# Patient Record
Sex: FEMALE | Race: WHITE | NOT HISPANIC OR LATINO | Employment: FULL TIME | URBAN - METROPOLITAN AREA
[De-identification: names, ages, dates, MRNs, and addresses within clinical notes are randomized per-mention and may not be internally consistent; named-entity substitution may affect disease eponyms.]

---

## 2024-07-12 ENCOUNTER — OFFICE VISIT (OUTPATIENT)
Dept: NEUROSURGERY | Facility: CLINIC | Age: 39
End: 2024-07-12
Payer: COMMERCIAL

## 2024-07-12 VITALS — TEMPERATURE: 97.5 F | WEIGHT: 179.3 LBS | HEIGHT: 67 IN | BODY MASS INDEX: 28.14 KG/M2

## 2024-07-12 DIAGNOSIS — G93.5 CHIARI I MALFORMATION: Primary | ICD-10-CM

## 2024-07-12 PROCEDURE — 1159F MED LIST DOCD IN RCRD: CPT | Performed by: NEUROLOGICAL SURGERY

## 2024-07-12 PROCEDURE — 1160F RVW MEDS BY RX/DR IN RCRD: CPT | Performed by: NEUROLOGICAL SURGERY

## 2024-07-12 PROCEDURE — 99203 OFFICE O/P NEW LOW 30 MIN: CPT | Performed by: NEUROLOGICAL SURGERY

## 2024-07-12 RX ORDER — METHOCARBAMOL 750 MG/1
TABLET, FILM COATED ORAL
COMMUNITY
Start: 2024-05-30

## 2024-07-12 RX ORDER — RIZATRIPTAN BENZOATE 10 MG/1
TABLET ORAL
COMMUNITY
Start: 2024-04-30

## 2024-07-12 RX ORDER — ACETAMINOPHEN AND CODEINE PHOSPHATE 300; 30 MG/1; MG/1
TABLET ORAL
COMMUNITY

## 2024-07-12 RX ORDER — IBUPROFEN 800 MG/1
TABLET ORAL
COMMUNITY
Start: 2024-05-30

## 2024-07-12 RX ORDER — AMITRIPTYLINE HYDROCHLORIDE 10 MG/1
TABLET, FILM COATED ORAL
COMMUNITY
Start: 2024-05-30

## 2024-07-12 NOTE — PROGRESS NOTES
Patient: Freddie Salgado  : 1985    Primary Care Provider: Sharmila Herrera APRN    Requesting Provider: As above        History    Chief Complaint: Headache.    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  This can be atop her head or occipital.  Her headache tends to be dull.  Sometimes she has nausea or associated dizziness.  The headaches do not occur every day.  She is not sure what makes the headaches better or worse.  She has no extremity, thoracic, abdominal symptoms.    Review of Systems   Constitutional:  Negative for activity change, appetite change, chills, diaphoresis, fatigue, fever and unexpected weight change.   HENT:  Negative for congestion, dental problem, drooling, ear discharge, ear pain, facial swelling, hearing loss, mouth sores, nosebleeds, postnasal drip, rhinorrhea, sinus pressure, sinus pain, sneezing, sore throat, tinnitus, trouble swallowing and voice change.    Eyes:  Negative for photophobia, pain, discharge, redness, itching and visual disturbance.   Respiratory:  Negative for apnea, cough, choking, chest tightness, shortness of breath, wheezing and stridor.    Cardiovascular:  Negative for chest pain, palpitations and leg swelling.   Gastrointestinal:  Negative for abdominal distention, abdominal pain, anal bleeding, blood in stool, constipation, diarrhea, nausea, rectal pain and vomiting.   Endocrine: Negative for cold intolerance, heat intolerance, polydipsia, polyphagia and polyuria.   Genitourinary:  Negative for decreased urine volume, difficulty urinating, dyspareunia, dysuria, enuresis, flank pain, frequency, genital sores, hematuria, menstrual problem, pelvic pain, urgency, vaginal bleeding, vaginal discharge and vaginal pain.   Musculoskeletal:  Negative for arthralgias, back pain, gait problem, joint swelling, myalgias, neck pain and neck stiffness.   Skin:  Negative  "for color change, pallor, rash and wound.   Allergic/Immunologic: Negative for environmental allergies, food allergies and immunocompromised state.   Neurological:  Positive for speech difficulty and headaches. Negative for dizziness, tremors, seizures, syncope, facial asymmetry, weakness, light-headedness and numbness.   Hematological:  Negative for adenopathy. Does not bruise/bleed easily.   Psychiatric/Behavioral:  Negative for agitation, behavioral problems, confusion, decreased concentration, dysphoric mood, hallucinations, self-injury, sleep disturbance and suicidal ideas. The patient is not nervous/anxious and is not hyperactive.      The patient's past medical history, past surgical history, family history, and social history have been reviewed at length in the electronic medical record.      Physical Exam:   Temp 97.5 °F (36.4 °C) (Infrared)   Ht 170.2 cm (67\")   Wt 81.3 kg (179 lb 4.8 oz)   BMI 28.08 kg/m²   CONSTITUTIONAL: Patient is well-nourished, pleasant and appears stated age.  MUSCULOSKELETAL:  Neck tenderness to palpation is not observed.   ROM in the neck is normal.  NEUROLOGICAL:  Orientation, memory, attention span, language function, and cognition have been examined and are intact.  Strength is intact in the upper and lower extremities to direct testing.  Muscle tone is normal throughout.  Station and gait are normal.  Sensation is intact to light touch testing throughout.  Deep tendon reflexes are 1+ and symmetrical.  Jenniffer's Sign is negative bilaterally. No clonus is elicited.  Coordination is intact.  CRANIAL NERVES:  Cranial Nerve II: Visual fields are full to confrontation.  Cranial Nerve III, IV, and VI: PERRLADC. Extraocular movements are intact.  Nystagmus is not present.  Cranial Nerve V: Facial sensation is intact to light touch.  Cranial Nerve VII: Muscles of facial expression demonstate no weakness or asymmetry.  Cranial Nerve VIII: Hearing is intact to finger rub " bilaterally.  Cranial Nerve IX and X: Palate elevates symmetrically.  Cranial Nerve XI: Shoulder shrug is intact bilaterally.  Cranial Nerve XII: Tongue is midline without evidence of atrophy or fasciculation.    Medical Decision Making    Data Review:   (All imaging studies were personally reviewed unless stated otherwise)  MRI of the brain dated 5/20/2024 without contrast demonstrates a Chiari I malformation.  Tonsils to send several millimeters below the foramen magnum.  I can visualize the cervical spinal cord to about the C4 level.  There is no syrinx.    Diagnosis:   1.  Nonspecific headache syndrome.  2.  Incidental Chiari malformation.    Treatment Options:   Based on her history I do not think the Chiari malformation is symptomatic and does not require treatment.  She will follow-up with neurosurgery on an as-needed basis.      Scribed for Jorje Grimm MD by Brittny Slaughter MA on 7/12/2024 11:05 EDT      I, Dr. Grimm, personally performed the services described in the documentation, as scribed in my presence, and it is both accurate and complete.

## 2025-03-12 PROBLEM — I25.85 CARDIAC MICROVASCULAR DISEASE: Status: ACTIVE | Noted: 2025-03-12

## 2025-03-12 NOTE — PROGRESS NOTES
"    Cardiology Follow-Up Note     Name: Freddie Salgado  :   1985  PCP: Sharmila Herrera, APRN  Date:   2025  Department: [facility] [place] [facility] CARDIOLOGY  [address]  [facility] [address]  Fax [phone]  Phone [phone]    Chief Complaint   Patient presents with    Hyperlipidemia    Hypertension       Subjective     History of Present Illness  Freddie Salgado is a 39 y.o. female who presents today for routine 3-month follow-up, patient was last seen in 2024.  Patient has a past medical history including.  The patient states that she is doing well, she ran out of her Ranexa medication and started having chest pain, she is here for refill.  She had also somewhat elevated cholesterol and has done diet modification.      Left heart cath 2023 EF 65%, normal coronaries normal LV function and microvascular disease.  2D echo 2023 EF 55 to 60%    Past Medical History:   Diagnosis Date    Abnormal Pap smear of cervix     Apnea     Chest pain     Gestational hypertension     1 episode, labs normal and resolved in this pregnancy    HPV (human papilloma virus) infection     Hyperemesis gravidarum       Past Surgical History:   Procedure Laterality Date    BARIATRIC SURGERY      CARDIAC CATHETERIZATION      23 [facility] IOP: Angina, and abnormal stress study / EF 65% /  Normal coronaries with normal LV function and Microvascular DZ.     SECTION      CHOLECYSTECTOMY      OTHER SURGICAL HISTORY      23 Left and right greater saphenous vein RF ablation was completed  without difficulty.     No current outpatient medications on file.    Objective     Vital Signs:  /82 (BP Location: Left arm, Patient Position: Sitting, Cuff Size: Adult)   Pulse 75   Ht 165.1 cm (65\")   Wt 81.6 kg (180 lb)   BMI 29.95 kg/m²   Estimated body mass index is 29.95 kg/m² as calculated from the following:    Height as of this encounter: 165.1 cm " "(65\").    Weight as of this encounter: 81.6 kg (180 lb).             Vitals reviewed.   Constitutional:       Appearance: Normal and healthy appearance.   Eyes:      Pupils: Pupils are equal, round, and reactive to light.   Pulmonary:      Effort: Pulmonary effort is normal.   Chest:      Chest wall: Not tender to palpatation.   Cardiovascular:      PMI at left midclavicular line. Normal rate. Regular rhythm. Normal S1. Normal S2.       Murmurs: There is no murmur.      No gallop.  No click. No rub.   Pulses:     Intact distal pulses.   Edema:     Peripheral edema absent.   Skin:     General: Skin is warm.   Psychiatric:         Behavior: Behavior is cooperative.              Data Review:   Lab Results   Component Value Date    CREATININE 0.50 (L) 12/17/2016    AST 13 12/17/2016    ALT 13 12/17/2016     No results found for: \"CHOL\", \"CHLPL\", \"TRIG\", \"HDL\", \"LDL\", \"LDLDIRECT\"   Lab Results   Component Value Date    WBC 8.25 12/17/2016    RBC 4.50 12/17/2016    HGB 11.5 12/17/2016    HCT 34.6 12/17/2016    MCV 76.9 (L) 12/17/2016     12/17/2016     No results found for: \"TSH\"  No results found for: \"HGBA1C\"  No results found for: \"INR\", \"PROTIME\"        Assessment and Plan     Assessment & Plan  Cardiac microvascular disease  She will continue Ranexa 500 mg p.o. twice daily.  Notify if symptoms gets worse.  I have reviewed patient rest of the medications and she is not taking Procardia she has never taken that medicine.  Therefore we will take that off the list.  Orders:    ranolazine (Ranexa) 500 MG 12 hr tablet; Take 1 tablet by mouth 2 (Two) Times a Day.    Lipid Panel; Future    Basic Metabolic Panel; Future    CBC & Differential; Future    Chronic venous insufficiency  The patient will continue to wear compression socks 20 to 30 mmHg pressure exercise, weight reduction, keep legs elevated on a pillow when resting above the level of the waistline.  Orders:    ranolazine (Ranexa) 500 MG 12 hr tablet; Take 1 " tablet by mouth 2 (Two) Times a Day.    Lipid Panel; Future    Basic Metabolic Panel; Future    CBC & Differential; Future    Hyperlipidemia LDL goal <70  Will check FLP LFTs for evaluation of the LDL level.  Patient will have blood drawn here today.           Advised to continue current cardiac medications. Please notify of any issues. Discussed with the patient compliance with medical management and follow-up.     Follow Up  Return in about 3 months (around 6/13/2025).    Call if you have any significant symptoms or go to the [facility] if possible.     Giles Louis MD, FACC,[facility]  [place] [facility]    Part of this note may be an electronic transcription/translation of spoken language to printed text using the Dragon Dictation System.

## 2025-03-13 ENCOUNTER — LAB (OUTPATIENT)
Facility: HOSPITAL | Age: 40
End: 2025-03-13
Payer: COMMERCIAL

## 2025-03-13 ENCOUNTER — OFFICE VISIT (OUTPATIENT)
Age: 40
End: 2025-03-13
Payer: COMMERCIAL

## 2025-03-13 VITALS
HEART RATE: 75 BPM | BODY MASS INDEX: 29.99 KG/M2 | DIASTOLIC BLOOD PRESSURE: 82 MMHG | SYSTOLIC BLOOD PRESSURE: 108 MMHG | HEIGHT: 65 IN | WEIGHT: 180 LBS

## 2025-03-13 DIAGNOSIS — I25.85 CARDIAC MICROVASCULAR DISEASE: ICD-10-CM

## 2025-03-13 DIAGNOSIS — I25.85 CARDIAC MICROVASCULAR DISEASE: Primary | ICD-10-CM

## 2025-03-13 DIAGNOSIS — I87.2 CHRONIC VENOUS INSUFFICIENCY: ICD-10-CM

## 2025-03-13 DIAGNOSIS — E78.5 HYPERLIPIDEMIA LDL GOAL <70: ICD-10-CM

## 2025-03-13 LAB
ANION GAP SERPL CALCULATED.3IONS-SCNC: 11.2 MMOL/L (ref 5–15)
BASOPHILS # BLD AUTO: 0.06 10*3/MM3 (ref 0–0.2)
BASOPHILS NFR BLD AUTO: 1 % (ref 0–1.5)
BUN SERPL-MCNC: 10 MG/DL (ref 6–20)
BUN/CREAT SERPL: 11.9 (ref 7–25)
CALCIUM SPEC-SCNC: 9.2 MG/DL (ref 8.6–10.5)
CHLORIDE SERPL-SCNC: 105 MMOL/L (ref 98–107)
CHOLEST SERPL-MCNC: 247 MG/DL (ref 0–200)
CO2 SERPL-SCNC: 24.8 MMOL/L (ref 22–29)
CREAT SERPL-MCNC: 0.84 MG/DL (ref 0.57–1)
DEPRECATED RDW RBC AUTO: 44.3 FL (ref 37–54)
EGFRCR SERPLBLD CKD-EPI 2021: 89.7 ML/MIN/1.73
EOSINOPHIL # BLD AUTO: 0.15 10*3/MM3 (ref 0–0.4)
EOSINOPHIL NFR BLD AUTO: 2.5 % (ref 0.3–6.2)
ERYTHROCYTE [DISTWIDTH] IN BLOOD BY AUTOMATED COUNT: 14.5 % (ref 12.3–15.4)
GLUCOSE SERPL-MCNC: 81 MG/DL (ref 65–99)
HCT VFR BLD AUTO: 43.4 % (ref 34–46.6)
HDLC SERPL-MCNC: 58 MG/DL (ref 40–60)
HGB BLD-MCNC: 13.7 G/DL (ref 12–15.9)
IMM GRANULOCYTES # BLD AUTO: 0.02 10*3/MM3 (ref 0–0.05)
IMM GRANULOCYTES NFR BLD AUTO: 0.3 % (ref 0–0.5)
LDLC SERPL CALC-MCNC: 173 MG/DL (ref 0–100)
LDLC/HDLC SERPL: 2.94 {RATIO}
LYMPHOCYTES # BLD AUTO: 1.45 10*3/MM3 (ref 0.7–3.1)
LYMPHOCYTES NFR BLD AUTO: 24 % (ref 19.6–45.3)
MCH RBC QN AUTO: 26.3 PG (ref 26.6–33)
MCHC RBC AUTO-ENTMCNC: 31.6 G/DL (ref 31.5–35.7)
MCV RBC AUTO: 83.3 FL (ref 79–97)
MONOCYTES # BLD AUTO: 0.8 10*3/MM3 (ref 0.1–0.9)
MONOCYTES NFR BLD AUTO: 13.2 % (ref 5–12)
NEUTROPHILS NFR BLD AUTO: 3.56 10*3/MM3 (ref 1.7–7)
NEUTROPHILS NFR BLD AUTO: 59 % (ref 42.7–76)
NRBC BLD AUTO-RTO: 0 /100 WBC (ref 0–0.2)
PLATELET # BLD AUTO: 321 10*3/MM3 (ref 140–450)
PMV BLD AUTO: 9.4 FL (ref 6–12)
POTASSIUM SERPL-SCNC: 4.2 MMOL/L (ref 3.5–5.2)
RBC # BLD AUTO: 5.21 10*6/MM3 (ref 3.77–5.28)
SODIUM SERPL-SCNC: 141 MMOL/L (ref 136–145)
TRIGL SERPL-MCNC: 92 MG/DL (ref 0–150)
VLDLC SERPL-MCNC: 16 MG/DL (ref 5–40)
WBC NRBC COR # BLD AUTO: 6.04 10*3/MM3 (ref 3.4–10.8)

## 2025-03-13 PROCEDURE — 80048 BASIC METABOLIC PNL TOTAL CA: CPT

## 2025-03-13 PROCEDURE — 36415 COLL VENOUS BLD VENIPUNCTURE: CPT

## 2025-03-13 PROCEDURE — 85025 COMPLETE CBC W/AUTO DIFF WBC: CPT

## 2025-03-13 PROCEDURE — 80061 LIPID PANEL: CPT

## 2025-03-13 RX ORDER — RANOLAZINE 500 MG/1
500 TABLET, EXTENDED RELEASE ORAL 2 TIMES DAILY
Qty: 180 TABLET | Refills: 1 | Status: SHIPPED | OUTPATIENT
Start: 2025-03-13

## 2025-03-13 NOTE — ASSESSMENT & PLAN NOTE
Will check FLP LFTs for evaluation of the LDL level.  Patient will have blood drawn here today.

## 2025-03-13 NOTE — ASSESSMENT & PLAN NOTE
She will continue Ranexa 500 mg p.o. twice daily.  Notify if symptoms gets worse.  I have reviewed patient rest of the medications and she is not taking Procardia she has never taken that medicine.  Therefore we will take that off the list.  Orders:    ranolazine (Ranexa) 500 MG 12 hr tablet; Take 1 tablet by mouth 2 (Two) Times a Day.    Lipid Panel; Future    Basic Metabolic Panel; Future    CBC & Differential; Future

## 2025-03-13 NOTE — ASSESSMENT & PLAN NOTE
The patient will continue to wear compression socks 20 to 30 mmHg pressure exercise, weight reduction, keep legs elevated on a pillow when resting above the level of the waistline.  Orders:    ranolazine (Ranexa) 500 MG 12 hr tablet; Take 1 tablet by mouth 2 (Two) Times a Day.    Lipid Panel; Future    Basic Metabolic Panel; Future    CBC & Differential; Future

## 2025-06-25 NOTE — PROGRESS NOTES
Cardiology Follow-Up Note     Name: Freddie Salgado  :   1985  PCP: Samira Mancilla, APRN  Date:   2025  Department: [facility] [place] [facility] CARDIOLOGY  [address]  [facility] [address]  Fax [phone]  Phone [phone]    Chief Complaint   Patient presents with    Hyperlipidemia     Problem list:  1.  Cardiac microvascular disease  -Left heart cath 2023 EF 65%, normal coronaries normal LV function and microvascular disease.  -2D echo 2023 EF 55 to 60%, trace MR, trace TR  2.  Chronic venous insufficiency   -Venous duplex 2023 bilateral GSV's remain occluded following catheter closure  3.  Hyperlipidemia   -3/13/2025    -2025 LDL 94    Subjective     History of Present Illness  Freddie Salgado is a 39 y.o. female who presents today for routine 3-month follow-up. Patient reports her primary care provider is working her up for autoimmune disorders and believes she may have lupus. She has been having increased fatigue, sensitivity to light, and forgetfulness. Also was placed on Rybelsus by primary care provider and been experiencing some GI side effects. Most recent A1c was 5.1%. Continues to have occasional chest tightness however relates this to anxiety/stress & reports that the ranolazine helps. Denies any shortness of breath or palpitations.  Reports her blood pressure is well-controlled at home. Reports occasional lower extremity edema and leg pain that is not worsened with walking.    Past Medical History:   Diagnosis Date    Abnormal Pap smear of cervix     Apnea     Chest pain     Diabetes mellitus     Gestational hypertension     1 episode, labs normal and resolved in this pregnancy    HPV (human papilloma virus) infection     Hyperemesis gravidarum       Past Surgical History:   Procedure Laterality Date    BARIATRIC SURGERY      CARDIAC CATHETERIZATION      23 [facility] IOP: Angina, and abnormal stress study / EF  "65% /  Normal coronaries with normal LV function and Microvascular DZ.     SECTION      CHOLECYSTECTOMY      OTHER SURGICAL HISTORY      23 Left and right greater saphenous vein RF ablation was completed  without difficulty.       Current Outpatient Medications:     ranolazine (Ranexa) 500 MG 12 hr tablet, Take 1 tablet by mouth 2 (Two) Times a Day., Disp: 180 tablet, Rfl: 1    Semaglutide (Rybelsus) 3 MG tablet, Take 1 tablet by mouth Daily. Pt states takes one forth of a tablet daily, Disp: , Rfl:     Objective     Vital Signs:  /76 (BP Location: Right arm, Patient Position: Sitting)   Pulse 83   Ht 165.1 cm (65\")   Wt 94.3 kg (208 lb)   BMI 34.61 kg/m²   Estimated body mass index is 34.61 kg/m² as calculated from the following:    Height as of this encounter: 165.1 cm (65\").    Weight as of this encounter: 94.3 kg (208 lb).       Vitals reviewed.   Constitutional:       Appearance: Healthy appearance. Not in distress.   Pulmonary:      Effort: Pulmonary effort is normal.      Breath sounds: Normal breath sounds.   Cardiovascular:      PMI at left midclavicular line. Normal rate. Regular rhythm.          Data Review:   Lab Results   Component Value Date    GLUCOSE 81 2025    BUN 10 2025    CREATININE 0.84 2025    BCR 11.9 2025    K 4.2 2025    CO2 24.8 2025    CALCIUM 9.2 2025    AST 13 2016    ALT 13 2016     Lab Results   Component Value Date    CHOL 247 (H) 2025    TRIG 92 2025    HDL 58 2025     (H) 2025      Lab Results   Component Value Date    WBC 6.04 2025    RBC 5.21 2025    HGB 13.7 2025    HCT 43.4 2025    MCV 83.3 2025     2025     Assessment and Plan     Assessment & Plan  Cardiac microvascular disease  CCS 1  Continue ranolazine 500 mg twice daily  Will reassess cardiac status in 6 months  Advised patient if symptoms change in nature or worsen, go to " closest ER for further evaluation       Chronic venous insufficiency of lower extremity  Previous bilateral GSV ablation 2023  Continues to report occasional leg swelling and pain   Denies claudication symptoms  Will repeat bilateral venous duplex with reflux prior to follow-up appointment    Orders:    Venous w Reflux Lower Extremity - Bilateral CAR; Future    Hyperlipidemia LDL goal <100   --> 94  Patient declines statin at this time  Continue diet/exercise changes  Will repeat full lipid panel prior to follow-up appointment  Patient aware of CV risks regarding increased LDL, will rediscuss statin initiation at next visit if needed    Orders:    Basic Metabolic Panel; Future    CBC & Differential; Future    Hepatic Function Panel; Future    High Sensitivity CRP; Future    Lipoprotein A (LPA); Future    Lipid Panel; Future    Advised to continue current cardiac medications. Please notify of any issues. Discussed with the patient compliance with medical management and follow-up.     Follow Up  Return in about 6 months (around 12/26/2025).    Call if you have any significant symptoms or go to the [facility] Emergency room if possible.     RHIANNON Espinosa  [place] [facility]    Part of this note may be an electronic transcription/translation of spoken language to printed text using the Dragon Dictation System.

## 2025-06-25 NOTE — ASSESSMENT & PLAN NOTE
CCS 1  Continue ranolazine 500 mg twice daily  Will reassess cardiac status in 6 months  Advised patient if symptoms change in nature or worsen, go to closest ER for further evaluation

## 2025-06-25 NOTE — ASSESSMENT & PLAN NOTE
Previous bilateral GSV ablation 2023  Continues to report occasional leg swelling and pain   Denies claudication symptoms  Will repeat bilateral venous duplex with reflux prior to follow-up appointment    Orders:    Venous w Reflux Lower Extremity - Bilateral CAR; Future

## 2025-06-26 ENCOUNTER — OFFICE VISIT (OUTPATIENT)
Age: 40
End: 2025-06-26
Payer: COMMERCIAL

## 2025-06-26 VITALS
WEIGHT: 208 LBS | SYSTOLIC BLOOD PRESSURE: 126 MMHG | HEIGHT: 65 IN | DIASTOLIC BLOOD PRESSURE: 76 MMHG | BODY MASS INDEX: 34.66 KG/M2 | HEART RATE: 83 BPM

## 2025-06-26 DIAGNOSIS — E78.5 HYPERLIPIDEMIA LDL GOAL <100: ICD-10-CM

## 2025-06-26 DIAGNOSIS — I25.85 CARDIAC MICROVASCULAR DISEASE: Primary | ICD-10-CM

## 2025-06-26 DIAGNOSIS — I87.2 CHRONIC VENOUS INSUFFICIENCY OF LOWER EXTREMITY: ICD-10-CM

## 2025-06-26 RX ORDER — ORAL SEMAGLUTIDE 3 MG/1
1 TABLET ORAL DAILY
COMMUNITY